# Patient Record
Sex: FEMALE | Race: WHITE | NOT HISPANIC OR LATINO | URBAN - METROPOLITAN AREA
[De-identification: names, ages, dates, MRNs, and addresses within clinical notes are randomized per-mention and may not be internally consistent; named-entity substitution may affect disease eponyms.]

---

## 2018-12-20 ENCOUNTER — INPATIENT (INPATIENT)
Facility: HOSPITAL | Age: 53
LOS: 0 days | Discharge: HOME | DRG: 743 | End: 2018-12-21
Attending: SPECIALIST | Admitting: SPECIALIST
Payer: MEDICAID

## 2018-12-20 VITALS
TEMPERATURE: 98 F | DIASTOLIC BLOOD PRESSURE: 74 MMHG | RESPIRATION RATE: 16 BRPM | SYSTOLIC BLOOD PRESSURE: 122 MMHG | HEIGHT: 62.5 IN | OXYGEN SATURATION: 100 % | WEIGHT: 126.1 LBS | HEART RATE: 89 BPM

## 2018-12-20 DIAGNOSIS — Z98.891 HISTORY OF UTERINE SCAR FROM PREVIOUS SURGERY: Chronic | ICD-10-CM

## 2018-12-20 DIAGNOSIS — Z41.9 ENCOUNTER FOR PROCEDURE FOR PURPOSES OTHER THAN REMEDYING HEALTH STATE, UNSPECIFIED: Chronic | ICD-10-CM

## 2018-12-20 LAB
BLD GP AB SCN SERPL QL: NEGATIVE — SIGNIFICANT CHANGE UP
HCT VFR BLD CALC: 35.1 % — SIGNIFICANT CHANGE UP (ref 34.5–45)
HGB BLD-MCNC: 12.1 G/DL — SIGNIFICANT CHANGE UP (ref 11.5–15.5)
MCHC RBC-ENTMCNC: 29.2 PG — SIGNIFICANT CHANGE UP (ref 27–34)
MCHC RBC-ENTMCNC: 34.5 G/DL — SIGNIFICANT CHANGE UP (ref 32–36)
MCV RBC AUTO: 84.8 FL — SIGNIFICANT CHANGE UP (ref 80–100)
PLATELET # BLD AUTO: 233 K/UL — SIGNIFICANT CHANGE UP (ref 150–400)
RBC # BLD: 4.14 M/UL — SIGNIFICANT CHANGE UP (ref 3.8–5.2)
RBC # FLD: 13.2 % — SIGNIFICANT CHANGE UP (ref 10.3–16.9)
RH IG SCN BLD-IMP: NEGATIVE — SIGNIFICANT CHANGE UP
WBC # BLD: 15.8 K/UL — HIGH (ref 3.8–10.5)
WBC # FLD AUTO: 15.8 K/UL — HIGH (ref 3.8–10.5)

## 2018-12-20 PROCEDURE — 72170 X-RAY EXAM OF PELVIS: CPT | Mod: 26

## 2018-12-20 RX ORDER — SODIUM CHLORIDE 9 MG/ML
1000 INJECTION, SOLUTION INTRAVENOUS
Qty: 0 | Refills: 0 | Status: DISCONTINUED | OUTPATIENT
Start: 2018-12-20 | End: 2018-12-21

## 2018-12-20 RX ORDER — OXYCODONE HYDROCHLORIDE 5 MG/1
10 TABLET ORAL EVERY 6 HOURS
Qty: 0 | Refills: 0 | Status: DISCONTINUED | OUTPATIENT
Start: 2018-12-20 | End: 2018-12-21

## 2018-12-20 RX ORDER — OXYCODONE AND ACETAMINOPHEN 5; 325 MG/1; MG/1
2 TABLET ORAL EVERY 6 HOURS
Qty: 0 | Refills: 0 | Status: DISCONTINUED | OUTPATIENT
Start: 2018-12-20 | End: 2018-12-20

## 2018-12-20 RX ORDER — KETOROLAC TROMETHAMINE 30 MG/ML
15 SYRINGE (ML) INJECTION EVERY 6 HOURS
Qty: 0 | Refills: 0 | Status: DISCONTINUED | OUTPATIENT
Start: 2018-12-20 | End: 2018-12-21

## 2018-12-20 RX ORDER — KETOROLAC TROMETHAMINE 30 MG/ML
15 SYRINGE (ML) INJECTION EVERY 6 HOURS
Qty: 0 | Refills: 0 | Status: DISCONTINUED | OUTPATIENT
Start: 2018-12-20 | End: 2018-12-20

## 2018-12-20 RX ORDER — DOCUSATE SODIUM 100 MG
100 CAPSULE ORAL THREE TIMES A DAY
Qty: 0 | Refills: 0 | Status: DISCONTINUED | OUTPATIENT
Start: 2018-12-20 | End: 2018-12-21

## 2018-12-20 RX ORDER — MORPHINE SULFATE 50 MG/1
4 CAPSULE, EXTENDED RELEASE ORAL EVERY 4 HOURS
Qty: 0 | Refills: 0 | Status: DISCONTINUED | OUTPATIENT
Start: 2018-12-20 | End: 2018-12-20

## 2018-12-20 RX ORDER — OXYCODONE AND ACETAMINOPHEN 5; 325 MG/1; MG/1
1 TABLET ORAL EVERY 4 HOURS
Qty: 0 | Refills: 0 | Status: DISCONTINUED | OUTPATIENT
Start: 2018-12-20 | End: 2018-12-20

## 2018-12-20 RX ORDER — OXYCODONE HYDROCHLORIDE 5 MG/1
5 TABLET ORAL EVERY 4 HOURS
Qty: 0 | Refills: 0 | Status: DISCONTINUED | OUTPATIENT
Start: 2018-12-20 | End: 2018-12-21

## 2018-12-20 RX ORDER — MORPHINE SULFATE 50 MG/1
4 CAPSULE, EXTENDED RELEASE ORAL
Qty: 0 | Refills: 0 | Status: DISCONTINUED | OUTPATIENT
Start: 2018-12-20 | End: 2018-12-20

## 2018-12-20 RX ORDER — ACETAMINOPHEN 500 MG
1000 TABLET ORAL EVERY 8 HOURS
Qty: 0 | Refills: 0 | Status: DISCONTINUED | OUTPATIENT
Start: 2018-12-20 | End: 2018-12-21

## 2018-12-20 RX ORDER — METOCLOPRAMIDE HCL 10 MG
10 TABLET ORAL EVERY 6 HOURS
Qty: 0 | Refills: 0 | Status: DISCONTINUED | OUTPATIENT
Start: 2018-12-20 | End: 2018-12-21

## 2018-12-20 RX ADMIN — Medication 15 MILLIGRAM(S): at 20:42

## 2018-12-20 RX ADMIN — Medication 15 MILLIGRAM(S): at 18:32

## 2018-12-20 NOTE — H&P ADULT - ASSESSMENT
54 y/o with uterine prolapse who desires definitive treatment   - plan for total vaginal hysterectomy, bilateral salpingectomy, uterosacral suspension, cystoscopy   - NPO   - IVH   - likely d/c from PACU verus 1 night admission

## 2018-12-20 NOTE — PROGRESS NOTE ADULT - ASSESSMENT
53y Female POD# 0 s/p vaginal hysterectomy, bilateral salpingectomy, uterosacral ligament suspension, posterior colporrhaphy                                       1. Neuro/Pain:  toradol, morphine, percocet  2  CV:   VS per routine  3. Pulm: Encourage ISS  4. GI: regular diet as tolerated  5. :  Reddy in place, TOV in AM  6. Heme: AM CBC; pad counts  7. ID: --  8. DVT ppx: SCDs  9. Dispo: Likely POD#1

## 2018-12-20 NOTE — PROGRESS NOTE ADULT - SUBJECTIVE AND OBJECTIVE BOX
GYN POC   Patient seen at bedside. Says that she wants to sit on toilet to pee although has nash catheter in place. Nash readjusted at bedside to improve drainage.   Pain controlled. Tolerating sips.  No OOB yet.     Denies CP, palpitations, SOB, fever, chills, nausea, vomiting.    Vital Signs Last 24 Hrs  T(C): 36.7 (20 Dec 2018 19:00), Max: 36.8 (20 Dec 2018 09:43)  T(F): 98 (20 Dec 2018 19:00), Max: 98.2 (20 Dec 2018 09:43)  HR: 44 (20 Dec 2018 20:46) (44 - 89)  BP: 104/65 (20 Dec 2018 20:46) (93/63 - 122/74)  BP(mean): 78 (20 Dec 2018 20:46) (69 - 90)  RR: 12 (20 Dec 2018 20:46) (12 - 17)  SpO2: 100% (20 Dec 2018 20:46) (96% - 100%)    Physical Exam:  Gen: No Acute Distress  Pulm: Clear to auscultation bilaterally  GI: soft, appropriately tender, mildly distended, decreased BS, no rebound, no guarding.  : Nash in place, orange tinged 2/2 pyridium; vaginal packing removed  Ext: SCDs in place, wnl    I&O's Summary    20 Dec 2018 07:01  -  20 Dec 2018 21:21  --------------------------------------------------------  IN: 3600 mL / OUT: 1700 mL / NET: 1900 mL      MEDICATIONS  (STANDING):  lactated ringers. 1000 milliLiter(s) (100 mL/Hr) IV Continuous <Continuous>    MEDICATIONS  (PRN):  docusate sodium 100 milliGRAM(s) Oral three times a day PRN stool softening.  ketorolac   Injectable 15 milliGRAM(s) IV Push every 6 hours PRN Severe Pain (7 - 10)  metoclopramide Injectable 10 milliGRAM(s) IV Push every 6 hours PRN Nausea and/or Vomiting  morphine  - Injectable 4 milliGRAM(s) IV Push every 3 hours PRN breakthrough pain  oxyCODONE    5 mG/acetaminophen 325 mG 1 Tablet(s) Oral every 4 hours PRN Moderate Pain (4 - 6)    Allergies    No Known Allergies    Intolerances        LABS:                        12.1   15.8  )-----------( 233      ( 20 Dec 2018 18:53 )             35.1

## 2018-12-20 NOTE — H&P ADULT - HISTORY OF PRESENT ILLNESS
54 y/o with bothersome uterine prolapse who desires definitive treatment. She remains sexually active and continues to have monthly menses. She denies any urinary complaints to include stress or urgency incontinence.

## 2018-12-21 VITALS
HEART RATE: 89 BPM | TEMPERATURE: 98 F | OXYGEN SATURATION: 98 % | SYSTOLIC BLOOD PRESSURE: 99 MMHG | DIASTOLIC BLOOD PRESSURE: 62 MMHG | RESPIRATION RATE: 17 BRPM

## 2018-12-21 LAB
ANION GAP SERPL CALC-SCNC: 7 MMOL/L — SIGNIFICANT CHANGE UP (ref 5–17)
BUN SERPL-MCNC: 8 MG/DL — SIGNIFICANT CHANGE UP (ref 7–23)
CALCIUM SERPL-MCNC: 7.8 MG/DL — LOW (ref 8.4–10.5)
CHLORIDE SERPL-SCNC: 106 MMOL/L — SIGNIFICANT CHANGE UP (ref 96–108)
CO2 SERPL-SCNC: 25 MMOL/L — SIGNIFICANT CHANGE UP (ref 22–31)
CREAT SERPL-MCNC: 0.66 MG/DL — SIGNIFICANT CHANGE UP (ref 0.5–1.3)
GLUCOSE SERPL-MCNC: 116 MG/DL — HIGH (ref 70–99)
HCT VFR BLD CALC: 31.4 % — LOW (ref 34.5–45)
HGB BLD-MCNC: 10.6 G/DL — LOW (ref 11.5–15.5)
MCHC RBC-ENTMCNC: 29 PG — SIGNIFICANT CHANGE UP (ref 27–34)
MCHC RBC-ENTMCNC: 33.8 G/DL — SIGNIFICANT CHANGE UP (ref 32–36)
MCV RBC AUTO: 86 FL — SIGNIFICANT CHANGE UP (ref 80–100)
PLATELET # BLD AUTO: 215 K/UL — SIGNIFICANT CHANGE UP (ref 150–400)
POTASSIUM SERPL-MCNC: 3.7 MMOL/L — SIGNIFICANT CHANGE UP (ref 3.5–5.3)
POTASSIUM SERPL-SCNC: 3.7 MMOL/L — SIGNIFICANT CHANGE UP (ref 3.5–5.3)
RBC # BLD: 3.65 M/UL — LOW (ref 3.8–5.2)
RBC # FLD: 13.7 % — SIGNIFICANT CHANGE UP (ref 10.3–16.9)
SODIUM SERPL-SCNC: 138 MMOL/L — SIGNIFICANT CHANGE UP (ref 135–145)
WBC # BLD: 13.3 K/UL — HIGH (ref 3.8–10.5)
WBC # FLD AUTO: 13.3 K/UL — HIGH (ref 3.8–10.5)

## 2018-12-21 RX ORDER — CLONAZEPAM 1 MG
1 TABLET ORAL
Qty: 0 | Refills: 0 | COMMUNITY

## 2018-12-21 RX ORDER — SERTRALINE 25 MG/1
1 TABLET, FILM COATED ORAL
Qty: 0 | Refills: 0 | COMMUNITY

## 2018-12-21 RX ORDER — LITHIUM CARBONATE 300 MG/1
2 TABLET, EXTENDED RELEASE ORAL
Qty: 0 | Refills: 0 | COMMUNITY

## 2018-12-21 RX ORDER — DEXTROAMPHETAMINE SACCHARATE, AMPHETAMINE ASPARTATE, DEXTROAMPHETAMINE SULFATE AND AMPHETAMINE SULFATE 1.875; 1.875; 1.875; 1.875 MG/1; MG/1; MG/1; MG/1
1 TABLET ORAL
Qty: 0 | Refills: 0 | COMMUNITY

## 2018-12-21 RX ADMIN — OXYCODONE HYDROCHLORIDE 5 MILLIGRAM(S): 5 TABLET ORAL at 09:57

## 2018-12-21 RX ADMIN — Medication 15 MILLIGRAM(S): at 03:10

## 2018-12-21 RX ADMIN — Medication 15 MILLIGRAM(S): at 03:30

## 2018-12-21 RX ADMIN — Medication 1000 MILLIGRAM(S): at 05:32

## 2018-12-21 RX ADMIN — OXYCODONE HYDROCHLORIDE 5 MILLIGRAM(S): 5 TABLET ORAL at 08:57

## 2018-12-21 RX ADMIN — Medication 1000 MILLIGRAM(S): at 06:08

## 2018-12-21 NOTE — DISCHARGE NOTE ADULT - PLAN OF CARE
Recovery Pelvic rest (nothing in vagina) x6 weeks or unless otherwise instructed by physician. Schedule follow up appointment with Dr. Finkelstein in 1-2 weeks. Go to nearest ED if fever >100.4, severe abdominal pain or heavy vaginal bleeding.   Wound care: Showering is allowed, no baths. Do not scrub incision area. Pat and dry all areas where incisions are.   Take Motrin 600mg every 6 hours or Tylenol 975mg every 8 hours as needed for pain

## 2018-12-21 NOTE — DISCHARGE NOTE ADULT - HOSPITAL COURSE
54yo s/p vaginal hysterectomy, bilateral salpingectomy, uterosacral ligament suspension, posterior repair for prolapse. Pt is hemodynamically stable at time of discharge.

## 2018-12-21 NOTE — DISCHARGE NOTE ADULT - CARE PLAN
Principal Discharge DX:	Incomplete uterine prolapse  Goal:	Recovery  Assessment and plan of treatment:	Pelvic rest (nothing in vagina) x6 weeks or unless otherwise instructed by physician. Schedule follow up appointment with Dr. Finkelstein in 1-2 weeks. Go to nearest ED if fever >100.4, severe abdominal pain or heavy vaginal bleeding.   Wound care: Showering is allowed, no baths. Do not scrub incision area. Pat and dry all areas where incisions are.   Take Motrin 600mg every 6 hours or Tylenol 975mg every 8 hours as needed for pain

## 2018-12-21 NOTE — DISCHARGE NOTE ADULT - PATIENT PORTAL LINK FT
You can access the [facility] Patient Portal, offered by [facility], by registering with the following website: [email or web address]

## 2018-12-21 NOTE — PROGRESS NOTE ADULT - ASSESSMENT
52 y/o POD1 after TVH, BS, uterosacral suspension, posterior repair. Operation complicated by EBL 1000, requiring 2uPRBC prophylactically   1. Neuro: Pain controlled with PO pain meds  2. CV:  heplock. No tachcyardia. No evidence of symptomatic anemia   3. Pulm: Patient encouraged to use incentive spirometer when awake. No issues   4. GI: regular diet  5. : voiding   6. DVT: SCDs and early ambulation   7. Dispo: likely d/c today

## 2018-12-21 NOTE — DISCHARGE NOTE ADULT - CARE PROVIDER_API CALL
Finkelstein, Seth A (MD), Obstetrics and Gynecology  [address] [place] [address]  Phone: [phone]  Fax: [phone]

## 2018-12-24 DIAGNOSIS — F41.0 PANIC DISORDER [EPISODIC PAROXYSMAL ANXIETY]: ICD-10-CM

## 2018-12-24 DIAGNOSIS — N81.2 INCOMPLETE UTEROVAGINAL PROLAPSE: ICD-10-CM

## 2018-12-24 DIAGNOSIS — F41.9 ANXIETY DISORDER, UNSPECIFIED: ICD-10-CM

## 2018-12-24 DIAGNOSIS — Z79.899 OTHER LONG TERM (CURRENT) DRUG THERAPY: ICD-10-CM

## 2018-12-28 LAB — SURGICAL PATHOLOGY STUDY: SIGNIFICANT CHANGE UP

## 2018-12-28 PROCEDURE — 86901 BLOOD TYPING SEROLOGIC RH(D): CPT

## 2018-12-28 PROCEDURE — 85027 COMPLETE CBC AUTOMATED: CPT

## 2018-12-28 PROCEDURE — 80048 BASIC METABOLIC PNL TOTAL CA: CPT

## 2018-12-28 PROCEDURE — 86923 COMPATIBILITY TEST ELECTRIC: CPT

## 2018-12-28 PROCEDURE — 36430 TRANSFUSION BLD/BLD COMPNT: CPT

## 2018-12-28 PROCEDURE — 72170 X-RAY EXAM OF PELVIS: CPT

## 2018-12-28 PROCEDURE — 88304 TISSUE EXAM BY PATHOLOGIST: CPT

## 2018-12-28 PROCEDURE — 86850 RBC ANTIBODY SCREEN: CPT

## 2018-12-28 PROCEDURE — 86900 BLOOD TYPING SEROLOGIC ABO: CPT

## 2018-12-28 PROCEDURE — P9016: CPT

## 2018-12-28 PROCEDURE — 36415 COLL VENOUS BLD VENIPUNCTURE: CPT

## 2018-12-28 PROCEDURE — 88305 TISSUE EXAM BY PATHOLOGIST: CPT

## 2023-06-22 NOTE — PATIENT PROFILE ADULT - NSPRONUTRITIONRISK_GEN_A_NUR
Ariel Gross is a 66 
No indicators present

## 2023-10-01 NOTE — H&P ADULT - NSHPSOURCEINFORD_GEN_ALL_CORE
Patient Use the eye ointment 4 times a day, use the pain medication as needed.  In addition use ibuprofen 600 mg every 6 hours for the pain also.    This will take 24 to 48 hours to resolve.  Return if you notice any change in your vision, please call the eye specialist tomorrow if this is not significantly improved or not resolved.

## 2024-02-05 NOTE — PROGRESS NOTE ADULT - SUBJECTIVE AND OBJECTIVE BOX
Left voicemail to call the office back.   Subjective: No acute events overnight. Patient had some discomfort with nash and it was removed early at 0900. Patient has voided. Minimal vaginal bleeding, less than a period. Patient without flatus, but hungry. Pain is controlled.     Patient denies fevers, chills, chest pain, shortness of breath, nausea, vomiting, diarrhea or constipation     Vital Signs Last 24 Hrs  T(C): 36.6 (21 Dec 2018 09:08), Max: 36.8 (20 Dec 2018 09:43)  T(F): 97.8 (21 Dec 2018 09:08), Max: 98.2 (20 Dec 2018 09:43)  HR: 62 (21 Dec 2018 09:08) (44 - 89)  BP: 92/52 (21 Dec 2018 09:08) (92/52 - 122/74)  BP(mean): 78 (20 Dec 2018 20:46) (69 - 90)  RR: 17 (21 Dec 2018 09:08) (12 - 18)  SpO2: 98% (21 Dec 2018 09:08) (96% - 100%)  I&O's Summary    20 Dec 2018 07:01  -  21 Dec 2018 07:00  --------------------------------------------------------  IN: 4300 mL / OUT: 2700 mL / NET: 1600 mL    21 Dec 2018 07:01  -  21 Dec 2018 09:19  --------------------------------------------------------  IN: 0 mL / OUT: 350 mL / NET: -350 mL      MEDICATIONS  (STANDING):  acetaminophen   Tablet .. 1000 milliGRAM(s) Oral every 8 hours    MEDICATIONS  (PRN):  docusate sodium 100 milliGRAM(s) Oral three times a day PRN stool softening.  ketorolac   Injectable 15 milliGRAM(s) IV Push every 6 hours PRN Severe Pain 1st  line  metoclopramide Injectable 10 milliGRAM(s) IV Push every 6 hours PRN Nausea and/or Vomiting  oxyCODONE    IR 5 milliGRAM(s) Oral every 4 hours PRN Moderate Pain (4 - 6)  oxyCODONE    IR 10 milliGRAM(s) Oral every 6 hours PRN Severe Pain (7 - 10)      PHYSICAL EXAM   Constitutional: Alert & Oriented x3, No acute distress, cooperative   Gastrointestinal: soft, non tender, positive bowel sounds, no rebound or guarding   Genitourinary: minimal vaginal bleeding   Extremities: no calf tenderness or swelling    LABS:                        10.6   13.3  )-----------( 215      ( 21 Dec 2018 08:33 )             31.4     12-21    138  |  106  |  8   ----------------------------<  116<H>  3.7   |  25  |  0.66    Ca    7.8<L>      21 Dec 2018 08:33